# Patient Record
Sex: FEMALE | Race: WHITE
[De-identification: names, ages, dates, MRNs, and addresses within clinical notes are randomized per-mention and may not be internally consistent; named-entity substitution may affect disease eponyms.]

---

## 2017-09-22 ENCOUNTER — HOSPITAL ENCOUNTER (EMERGENCY)
Dept: HOSPITAL 10 - FTE | Age: 27
LOS: 1 days | Discharge: HOME | End: 2017-09-23
Payer: MEDICAID

## 2017-09-22 VITALS
HEIGHT: 60 IN | BODY MASS INDEX: 32.25 KG/M2 | HEIGHT: 60 IN | WEIGHT: 164.24 LBS | BODY MASS INDEX: 32.25 KG/M2 | WEIGHT: 164.24 LBS

## 2017-09-22 DIAGNOSIS — R07.1: Primary | ICD-10-CM

## 2017-09-22 PROCEDURE — 71010: CPT

## 2017-09-23 VITALS
SYSTOLIC BLOOD PRESSURE: 121 MMHG | RESPIRATION RATE: 16 BRPM | HEART RATE: 73 BPM | TEMPERATURE: 98.2 F | DIASTOLIC BLOOD PRESSURE: 72 MMHG

## 2017-09-23 NOTE — RADRPT
PROCEDURE:   Chest. 

 

CLINICAL INDICATION:    Chest pain. 

 

TECHNIQUE:   Single frontal view of the chest was obtained. 

 

COMPARISON:   None. 

 

FINDINGS:

The cardiac silhouette is within normal limits.  The aortic arch is unremarkable.  There is no focal
 consolidation, vascular congestion or pleural effusion.  There is no pneumothorax. 

 

IMPRESSION:

No evidence for active cardiopulmonary disease.

_____________________________________________ 

.Greg Roberson MD, MD           Date    Time 

Electronically viewed and signed by .Greg Roberson MD, MD on 09/23/2017 00:51 

 

D:  09/23/2017 00:51  T:  09/23/2017 00:51

.T/
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
## 2019-01-20 ENCOUNTER — HOSPITAL ENCOUNTER (EMERGENCY)
Dept: HOSPITAL 10 - FTE | Age: 29
Discharge: HOME | End: 2019-01-20
Payer: MEDICAID

## 2019-01-20 ENCOUNTER — HOSPITAL ENCOUNTER (EMERGENCY)
Dept: HOSPITAL 91 - FTE | Age: 29
Discharge: HOME | End: 2019-01-20
Payer: MEDICAID

## 2019-01-20 VITALS
WEIGHT: 185.41 LBS | HEIGHT: 63 IN | BODY MASS INDEX: 32.85 KG/M2 | WEIGHT: 185.41 LBS | BODY MASS INDEX: 32.85 KG/M2 | HEIGHT: 63 IN

## 2019-01-20 VITALS — HEART RATE: 101 BPM | DIASTOLIC BLOOD PRESSURE: 66 MMHG | SYSTOLIC BLOOD PRESSURE: 130 MMHG | RESPIRATION RATE: 18 BRPM

## 2019-01-20 DIAGNOSIS — R05: Primary | ICD-10-CM

## 2019-01-20 PROCEDURE — 99282 EMERGENCY DEPT VISIT SF MDM: CPT

## 2019-01-20 NOTE — ERD
ER Documentation


Chief Complaint


Chief Complaint





cough & congestion 2 wks





HPI


28-year-old female presenting with cough and congestion times 2 weeks.  She has 


not taken any medications for her symptoms.  Denies other medical problems.  


States she has a mild runny nose with mild sore throat.  Positive sick contacts 


at home.  Up-to-date on vaccinations.  Surgical history denies.  Social history 


denies





ROS


All systems reviewed and are negative except as per history of present illness.





Medications


Home Meds


Active Scripts


Promethazine Hcl* (Promethazine Hcl* Syrup) 6.25 Mg/5 Ml Syrup, 6.25 MG PO Q6H 


PRN for COUGH, #100 ML


   Prov:GENE LEIJA PA-C         1/20/19


Pseudoephedrine Hcl* (Suphedrin*) 30 Mg Tablet, 30 MG PO Q6 PRN for CONGESTION, 


#30 TAB


   Prov:GENE LEIJA PA-C         1/20/19


Acetaminophen* (Tylophen*) 500 Mg Capsule, 2 CAP PO Q8H PRN for PAIN AND OR 


ELEVATED TEMP, #20 CAP


   Prov:GENE LEIJA PA-C         1/20/19


Ibuprofen* (Motrin*) 800 Mg Tab, 800 MG PO Q6, #30 TAB


   Prov:GENE LEIJA PA-C         1/20/19


Ibuprofen* (Motrin*) 400 Mg Tab, 400 MG PO Q6, #30 TAB


   Prov:ANGIE SMITH PA-C         9/23/17


Ibuprofen* (Ibuprofen*) 600 Mg Tablet, 600 MG PO Q6, #20 TAB 0 Refills


   Prov:CHANDNI SANCHEZ MD         4/27/17





Allergies


Allergies:  


Coded Allergies:  


     No Known Allergy (Unverified , 9/22/17)





PMhx/Soc


History of Surgery:  No


Anesthesia Reaction:  No


Hx Neurological Disorder:  No


Hx Respiratory Disorders:  No


Hx Cardiac Disorders:  No


Hx Psychiatric Problems:  No


Hx Miscellaneous Medical Probl:  No


Hx Alcohol Use:  No


Hx Substance Use:  No


Hx Tobacco Use:  No


Smoking Status:  Never smoker





FmHx


Family History:  No diabetes, No coronary disease, No other





Physical Exam


Vitals





Vital Signs


  Date      Temp  Pulse  Resp  B/P (MAP)   Pulse Ox  O2          O2 Flow    FiO2


Time                                                 Delivery    Rate


   1/20/19  97.5    101    18      130/66        97


     10:15                           (87)





Physical Exam


GENERAL: The patient is well-appearing, well-nourished, in no acute distress


HEENT: Atraumatic.  Conjunctivae are pink.  Pupils equal, round, and reactive to


light.  There is no scleral icterus.  Tympanic membranes clear bilaterally.  


Oropharynx clear.  


NECK: C-spine is soft and supple.  There is no meningismus.  There is no 


cervical lymphadenopathy. 


CHEST: Clear to auscultation bilaterally.  There are no rales, wheezes or 


rhonchi.


HEART: Regular rate and rhythm.  No murmurs, clicks, rubs or gallops.





Procedures/MDM


MDM: 28-year-old female presenting with URI symptoms.  I have low suspicion for 


pneumonia.  I have low suspicion for bacterial HEENT infection.  I have low 


suspicion for meningitis or sepsis.  Patient is discharged with supportive 


medications and told to follow-up with primary care within 1-2 days for close 


evaluation.  I do not feel that antibiotics are indicated.  All questions 


answered at discharge





Departure


Diagnosis:  


   Primary Impression:  


   Cough


Condition:  Stable


Patient Instructions:  Cough, Chronic, Uncertain Cause, (Adult)


Referrals:  


[facility]


YOU HAVE RECEIVED A MEDICAL SCREENING EXAM AND THE RESULTS INDICATE THAT YOU DO 


NOT HAVE A CONDITION THAT REQUIRES URGENT TREATMENT IN THE EMERGENCY DEPARTMENT.





FURTHER EVALUATION AND TREATMENT OF YOUR CONDITION CAN WAIT UNTIL YOU ARE SEEN 


IN YOUR DOCTORS OFFICE WITHIN THE NEXT 1-2 DAYS. IT IS YOUR RESPONSIBILITY TO 


MAKE AN APPOINTMENT FOR FOLOW-UP CARE.





IF YOU HAVE A PRIMARY DOCTOR


--you should call your primary doctor and schedule an appointment





IF YOU DO NOT HAVE A PRIMARY DOCTOR YOU CAN CALL OUR PHYSICIAN REFERRAL HOTLINE 


AT


 [phone] 





IF YOU CAN NOT AFFORD TO SEE A PHYSICIAN YOU CAN CHOSE FROM THE FOLLOWING 


[facility]





[facility] [phone] [address] [facility]. [facility] [phone] [address] [facility] access [facility]. [facility] [phone] [address] [facility]. [facility] [phone] [address] [facility]. [facility] [phone] [address] [facility]. [facility]. [phone]


[address]





Additional Instructions:  


FOLLOW UP WITH YOUR PRIMARY CARE PHYSICIAN TOMORROW.Return to this facility if 


you are not improving as expected.











GENE LEIJA PA-C       Jan 20, 2019 15:16